# Patient Record
Sex: FEMALE | Race: WHITE | URBAN - METROPOLITAN AREA
[De-identification: names, ages, dates, MRNs, and addresses within clinical notes are randomized per-mention and may not be internally consistent; named-entity substitution may affect disease eponyms.]

---

## 2019-04-29 ENCOUNTER — APPOINTMENT (RX ONLY)
Dept: URBAN - METROPOLITAN AREA CLINIC 173 | Facility: CLINIC | Age: 79
Setting detail: DERMATOLOGY
End: 2019-04-29

## 2019-04-29 DIAGNOSIS — L21.8 OTHER SEBORRHEIC DERMATITIS: ICD-10-CM

## 2019-04-29 DIAGNOSIS — K13.0 DISEASES OF LIPS: ICD-10-CM

## 2019-04-29 PROBLEM — L20.84 INTRINSIC (ALLERGIC) ECZEMA: Status: ACTIVE | Noted: 2019-04-29

## 2019-04-29 PROBLEM — M12.9 ARTHROPATHY, UNSPECIFIED: Status: ACTIVE | Noted: 2019-04-29

## 2019-04-29 PROBLEM — Z85.79 PERSONAL HISTORY OF OTHER MALIGNANT NEOPLASMS OF LYMPHOID, HEMATOPOIETIC AND RELATED TISSUES: Status: ACTIVE | Noted: 2019-04-29

## 2019-04-29 PROBLEM — I63.50 CEREBRAL INFARCTION DUE TO UNSPECIFIED OCCLUSION OR STENOSIS OF UNSPECIFIED CEREBRAL ARTERY: Status: ACTIVE | Noted: 2019-04-29

## 2019-04-29 PROBLEM — Z85.828 PERSONAL HISTORY OF OTHER MALIGNANT NEOPLASM OF SKIN: Status: ACTIVE | Noted: 2019-04-29

## 2019-04-29 PROBLEM — L29.8 OTHER PRURITUS: Status: ACTIVE | Noted: 2019-04-29

## 2019-04-29 PROBLEM — Z85.6 PERSONAL HISTORY OF LEUKEMIA: Status: ACTIVE | Noted: 2019-04-29

## 2019-04-29 PROBLEM — Z85.3 PERSONAL HISTORY OF MALIGNANT NEOPLASM OF BREAST: Status: ACTIVE | Noted: 2019-04-29

## 2019-04-29 PROCEDURE — ? TREATMENT REGIMEN

## 2019-04-29 PROCEDURE — 99213 OFFICE O/P EST LOW 20 MIN: CPT

## 2019-04-29 PROCEDURE — ? COUNSELING

## 2019-04-29 ASSESSMENT — LOCATION DETAILED DESCRIPTION DERM
LOCATION DETAILED: RIGHT NASAL SIDEWALL
LOCATION DETAILED: RIGHT CENTRAL BUCCAL CHEEK
LOCATION DETAILED: RIGHT ORAL COMMISSURE

## 2019-04-29 ASSESSMENT — LOCATION ZONE DERM
LOCATION ZONE: LIP
LOCATION ZONE: FACE
LOCATION ZONE: NOSE

## 2019-04-29 ASSESSMENT — LOCATION SIMPLE DESCRIPTION DERM
LOCATION SIMPLE: RIGHT LIP
LOCATION SIMPLE: RIGHT CHEEK
LOCATION SIMPLE: RIGHT NOSE

## 2019-04-29 NOTE — HPI: SKIN LESION
What Type Of Note Output Would You Prefer (Optional)?: Bullet Format
How Severe Is Your Skin Lesion?: moderate
Has Your Skin Lesion Been Treated?: been treated
Is This A New Presentation, Or A Follow-Up?: Skin Lesion
Additional History: Patient is here for evaluation of skin lesions

## 2019-04-29 NOTE — PROCEDURE: TREATMENT REGIMEN
Detail Level: Detailed
Initiate Treatment: Ketoconazole cream
Plan: Ketoconazole cream bid until clear; also, although no oral yeast infection is present, rec: beyond soaking denture in [place], perfom diluted vinegar rinse as well as vigorous scrub with firm tooth brush of dentures daily
Initiate Treatment: ketoconazole cream bid
Plan: f/u 2 weeks

## 2019-05-13 ENCOUNTER — APPOINTMENT (RX ONLY)
Dept: URBAN - METROPOLITAN AREA CLINIC 173 | Facility: CLINIC | Age: 79
Setting detail: DERMATOLOGY
End: 2019-05-13

## 2019-05-13 DIAGNOSIS — L21.8 OTHER SEBORRHEIC DERMATITIS: ICD-10-CM

## 2019-05-13 DIAGNOSIS — K13.0 DISEASES OF LIPS: ICD-10-CM

## 2019-05-13 PROBLEM — L30.9 DERMATITIS, UNSPECIFIED: Status: ACTIVE | Noted: 2019-05-13

## 2019-05-13 PROBLEM — Z85.828 PERSONAL HISTORY OF OTHER MALIGNANT NEOPLASM OF SKIN: Status: ACTIVE | Noted: 2019-05-13

## 2019-05-13 PROCEDURE — 99212 OFFICE O/P EST SF 10 MIN: CPT

## 2019-05-13 PROCEDURE — ? COUNSELING

## 2019-05-13 PROCEDURE — ? PRESCRIPTION

## 2019-05-13 PROCEDURE — ? TREATMENT REGIMEN

## 2019-05-13 RX ORDER — MUPIROCIN 20 MG/G
1 OINTMENT TOPICAL TWICE A DAY
Qty: 1 | Refills: 2 | Status: ERX | COMMUNITY
Start: 2019-05-13

## 2019-05-13 RX ORDER — KETOCONAZOLE 20 MG/G
1 CREAM TOPICAL BID
Qty: 3 | Refills: 3 | Status: ERX

## 2019-05-13 RX ADMIN — MUPIROCIN 1: 20 OINTMENT TOPICAL at 21:55

## 2019-05-13 ASSESSMENT — LOCATION DETAILED DESCRIPTION DERM: LOCATION DETAILED: RIGHT ORAL COMMISSURE

## 2019-05-13 ASSESSMENT — LOCATION ZONE DERM: LOCATION ZONE: LIP

## 2019-05-13 ASSESSMENT — LOCATION SIMPLE DESCRIPTION DERM: LOCATION SIMPLE: RIGHT LIP

## 2019-05-13 NOTE — PROCEDURE: TREATMENT REGIMEN
Continue Regimen: Ketoconazole cream as directed
Initiate Treatment: Mupirocin ointment as directed
Detail Level: Zone
Detail Level: Detailed
Initiate Treatment: Ketoconazole cream
Plan: Add mupirocin ointment bid and zinc oxide before bed as a barrier cream to the current Ketoconazole cream bid until clear; also, continue regimen  beyond soaking denture in [place]; perform diluted vinegar rinse as well as vigorous scrub with firm tooth brush of dentures daily